# Patient Record
Sex: MALE | Race: WHITE | Employment: OTHER | URBAN - NONMETROPOLITAN AREA
[De-identification: names, ages, dates, MRNs, and addresses within clinical notes are randomized per-mention and may not be internally consistent; named-entity substitution may affect disease eponyms.]

---

## 2024-04-25 ENCOUNTER — OFFICE VISIT (OUTPATIENT)
Dept: OTOLARYNGOLOGY | Facility: CLINIC | Age: 59
End: 2024-04-25

## 2024-04-25 VITALS
HEART RATE: 61 BPM | DIASTOLIC BLOOD PRESSURE: 90 MMHG | HEIGHT: 69 IN | SYSTOLIC BLOOD PRESSURE: 140 MMHG | TEMPERATURE: 98 F | RESPIRATION RATE: 18 BRPM | BODY MASS INDEX: 26.59 KG/M2 | OXYGEN SATURATION: 98 % | WEIGHT: 179.5 LBS

## 2024-04-25 DIAGNOSIS — R09.82 PND (POST-NASAL DRIP): ICD-10-CM

## 2024-04-25 DIAGNOSIS — K21.9 LARYNGOPHARYNGEAL REFLUX (LPR): ICD-10-CM

## 2024-04-25 DIAGNOSIS — R09.A2 GLOBUS SENSATION: Primary | ICD-10-CM

## 2024-04-25 RX ORDER — OMEPRAZOLE 40 MG/1
40 CAPSULE, DELAYED RELEASE ORAL DAILY
Qty: 30 CAPSULE | Refills: 4 | Status: SHIPPED | OUTPATIENT
Start: 2024-04-25

## 2024-04-25 RX ORDER — ERGOCALCIFEROL 1.25 MG/1
50000 CAPSULE ORAL WEEKLY
COMMUNITY

## 2024-04-25 RX ORDER — BUPROPION HYDROCHLORIDE 300 MG/1
300 TABLET ORAL
COMMUNITY

## 2024-04-25 RX ORDER — OXYBUTYNIN CHLORIDE 10 MG/1
10 TABLET, EXTENDED RELEASE ORAL
COMMUNITY
Start: 2024-04-09

## 2024-04-25 RX ORDER — FAMOTIDINE 40 MG/1
40 TABLET, FILM COATED ORAL
Qty: 30 TABLET | Refills: 4 | Status: SHIPPED | OUTPATIENT
Start: 2024-04-25

## 2024-04-25 RX ORDER — SIMETHICONE 125 MG
125 TABLET,CHEWABLE ORAL EVERY 6 HOURS PRN
COMMUNITY

## 2024-04-25 RX ORDER — NAPROXEN 500 MG/1
500 TABLET ORAL
COMMUNITY

## 2024-04-25 RX ORDER — ROSUVASTATIN CALCIUM 20 MG/1
20 TABLET, COATED ORAL DAILY
COMMUNITY

## 2024-04-25 RX ORDER — DOXEPIN HYDROCHLORIDE 50 MG/1
50 CAPSULE ORAL
COMMUNITY

## 2024-04-25 RX ORDER — OMEPRAZOLE 40 MG/1
40 CAPSULE, DELAYED RELEASE ORAL DAILY
COMMUNITY
Start: 2024-01-26

## 2024-04-25 RX ORDER — SERTRALINE HYDROCHLORIDE 25 MG/1
25 TABLET, FILM COATED ORAL DAILY
COMMUNITY

## 2024-04-25 NOTE — PROGRESS NOTES
[facility] New Patient  visit      Reinaldo Solis is a 58 y.o. male who presents with a chief complaint of throat    Independent historian:   [id number]    Pertinent elements of the history:  Sensation of something in the throat ongoing for ~ 2 years, possibly longer  Sometimes has mucus or phlegm in the throat  Sensation comes and goes   Some days, symptoms are stronger than others  Today, he is asymptomatic     Was referred to GI by PCP -- scheduled in June     Saw ENT in 2022---said it was reflux, was unable to tolerate laryngoscopy   No follow up after visit    Uses home remedies, which helps   Has been on PPI several years ago    No dry cough  No vocal changes   No dysphagia  No dyspnea  No choking   No throat pain or otalgia  No nasal symptoms  No rhinorrhea    Nonsmoker       Review of any relevant imaging: images from any scan reviewed personally  Scans:   Labs:   Notes: ENT note    Review of Systems:  As above    PMHx:  Past Medical History:   Diagnosis Date    Anxiety     GERD (gastroesophageal reflux disease)     Hyperlipidemia         FAMHx:  History reviewed. No pertinent family history.    SOCHx:  Social History     Socioeconomic History    Marital status: Unknown     Spouse name: None    Number of children: None    Years of education: None    Highest education level: None   Occupational History    None   Tobacco Use    Smoking status: Never     Passive exposure: Never    Smokeless tobacco: Never   Vaping Use    Vaping status: Never Used   Substance and Sexual Activity    Alcohol use: Yes     Comment: socially only rarely    Drug use: Never    Sexual activity: None   Other Topics Concern    None   Social History Narrative    None     Social Determinants of Health     Financial Resource Strain: Not on file   Food Insecurity: Not on file   Transportation Needs: Not on file   Physical Activity: Not on file   Stress: Not on file   Social Connections: Not on file   Intimate  "Partner Violence: Not on file   Housing Stability: Not on file       Allergies:  Allergies   Allergen Reactions    Penicillins Hives     Joyce. Ancef        MEDS:  Reviewed      Physical exam: (abnormal findings appear in bold and supercede any conflicting normal findings listed below)    /90   Pulse 61   Temp 98 °F (36.7 °C) (Temporal)   Resp 18   Ht 5' 9\" (1.753 m)   Wt 81.4 kg (179 lb 8 oz)   SpO2 98%   BMI 26.51 kg/m²     Constitutional:  Well developed, well nourished and groomed, in no acute distress.     Eyes:  Extra-ocular movements intact, pupils equally round and reactive to light and accommodation, the lids and conjunctivae are normal in appearance.    Head: Atraumatic, normocephalic, no visible scalp lesions, bony palpation unremarkable without stepoffs, parotid and submandibular salivary glands non-tender to palpation and without masses bilaterally.     Ears:  Auricles normal in appearance bilaterally, mastoid prominence non-tender, external auditory canals clear bilaterally, tympanic membranes intact bilaterally without evidence of middle ear effusion or masses, normal appearing ossicles.     Nose/Sinuses:  External appearance unremarkable, no maxillary or frontal sinus tenderness to palpation bilaterally. Anterior rhinoscopy demonstrates pink mucosa. No polyps or other masses identified. Turbinates are non-edematous. No evidence of purulent drainage. Clear, sticky secretions between lateral wall and septum R>L    Oral Cavity:  Moist mucus membranes, gums and dentition unremarkable, no oral mucosal masses or lesions, floor of mouth soft, tongue mobile without masses or lesions.     Oropharynx:  Base of tongue soft and without masses, tonsils bilaterally unremarkable, soft palate mucosa unremarkable. Tonsils 1+, symmetric    Neck:  No visible or palpable cervical lesions or lymphadenopathy, thyroid gland is normal in size and symmetry and without masses, normal laryngeal elevation with " swallowing.     Cardiovascular:  Normal rate and rhythm, no palpable thrills, no jugulovenous distension observed.  Respiratory:  Normal respiratory effort without evidence of retractions or use of accessory muscles.  Integument:  Normal appearing without observed masses or lesions.  Neurologic:  Cranial nerves II-XII intact bilaterally.  Psychiatric:  Alert and oriented to time, place and person, normal affect.      Procedure:   Laryngoscopy          Performed by Maris Anton PA-C      Authorized by Maris Anton PA-C        Consent: Verbal consent obtained.  Consent given by: patient  Patient understanding: patient states understanding of the procedure being performed        Local anesthesia used: yes      Anesthesia    Local anesthesia used: yes  Local Anesthetic: topical anesthetic      Sedation    Patient sedated: no           Culture: no   Procedure Details    Procedure Notes:  Nasopharynx unremarkable, with normal eustachian tube orifices and normal Fossa of Rosenmuller bilaterally. Posterior nasopharyngeal and oropharyngeal walls normal. Oropharyngeal mucosa moist, no masses or lesions. Tongue base normal without masses or lesions. Vallecula and pyriform sinuses clear, without pooling of secretions. Epiglottis, aryepiglottic folds and remainder of supraglottis well-appearing. True vocal folds mobile, without masses or lesions, normal glottic chink.     Other findings: clear secretions in the nasopharynx. Adenoids 1+. Arytenoid erythema. No mucopus or polyps     Patient tolerance: Patient tolerated the procedure well with no immediate complications         Audiometry:        Assessment:  1. Globus sensation  omeprazole (PriLOSEC) 40 MG capsule    famotidine (PEPCID) 40 MG tablet      2. PND (post-nasal drip)        3. Laryngopharyngeal reflux (LPR)  omeprazole (PriLOSEC) 40 MG capsule    famotidine (PEPCID) 40 MG tablet          Plan:  1. Reinaldo Solis is a 58 y.o. male with acute and chronic  "problems as above who presents for evaluation of chronic, intermittent globus sensation. Prior ENT eval 2 years ago at [facility]. Unable to tolerate laryngoscopy. PPI was prescribed. There was no follow up after that visit. He is scheduled to see GI in June.    Tonsils 1+ and symmetric  FFL today clear. TVC's are mobile. There are clear secretions within the nasopharynx. No laryngeal masses or lesions.    Discussed potential causes of globus sensation which may be multi-factorial including chronic rhinitis, sinusitis, reflux, esophageal process, mass effect.     Recommend trial of PPI + H2 blocker. Side effects reviewed.   Keep GI f/u in June. Consider EGD     Consider barium studies.  Other medications to consider include nasal corticosteroids, nasal anticholinergic, antihistamines.     F/u after GI, approx 3 months.         ** Please Note: Portions of the record may have been created with voice recognition software. Occasional wrong word or \"sound a like\" substitutions may have occurred due to the inherent limitations of voice recognition software. There may also be notations and random deletions of words or characters from malfunctioning software. Read the chart carefully and recognize, using context, where substitutions/deletions have occurred.**    "

## 2024-05-23 ENCOUNTER — TELEPHONE (OUTPATIENT)
Age: 59
End: 2024-05-23

## 2024-05-23 NOTE — TELEPHONE ENCOUNTER
Pt called because he was happy with [facility] ENT and wanted telephone number of Urology . Provided [phone] in [place]. Also gave him the [facility] Urology Number [phone]

## 2024-06-10 ENCOUNTER — NURSE TRIAGE (OUTPATIENT)
Age: 59
End: 2024-06-10

## 2024-06-10 NOTE — TELEPHONE ENCOUNTER
NP- difficulty urinating       Patient calling in with  and expressing his concern because he is having trouble urinating. When he urinates he states it is weak and small amounts. Patient is also having abdominal pain.           CB: [phone]

## 2024-06-10 NOTE — TELEPHONE ENCOUNTER
"Answer Assessment - Initial Assessment Questions  1. REASON FOR CALL or QUESTION: \"What is your reason for calling today?\" or \"How can I best help you?\" or \"What question do you have that I can help answer?\"          nterpreter # [id number]     NP, needs an appt to establish care     Patient c/o of weak stream,  little output and abdominal pain.  sxs have been ongoing for 1 month. Patient states that he saw his PCP on Friday and was advised to call [facility] urology and schedule an appt.     Informed patient to call PCP or go to ED if sxs worsen, patient verbalized understanding     Advised we will call him to set up a new patient appt, verbalized understanding     Patient request [facility]    Protocols used: Information Only Call - No Triage-ADULT-[place]    "

## 2024-06-13 NOTE — TELEPHONE ENCOUNTER
Pt calling back in with  [phone] to advise he is still experiencing difficulty with urination and abdominal pain and has not yet been scheduled for an appointment. Pt was scheduled for the next available of 7/24 and placed on the wait list. Please advise if patient can be seen sooner.     Call back: [phone]